# Patient Record
Sex: MALE | Race: OTHER | HISPANIC OR LATINO | Employment: UNEMPLOYED | URBAN - METROPOLITAN AREA
[De-identification: names, ages, dates, MRNs, and addresses within clinical notes are randomized per-mention and may not be internally consistent; named-entity substitution may affect disease eponyms.]

---

## 2023-10-25 ENCOUNTER — HOSPITAL ENCOUNTER (EMERGENCY)
Facility: HOSPITAL | Age: 28
Discharge: HOME | End: 2023-10-25
Attending: EMERGENCY MEDICINE

## 2023-10-25 ENCOUNTER — APPOINTMENT (EMERGENCY)
Dept: RADIOLOGY | Facility: HOSPITAL | Age: 28
End: 2023-10-25

## 2023-10-25 VITALS
WEIGHT: 140 LBS | OXYGEN SATURATION: 100 % | SYSTOLIC BLOOD PRESSURE: 119 MMHG | DIASTOLIC BLOOD PRESSURE: 60 MMHG | RESPIRATION RATE: 18 BRPM | HEART RATE: 62 BPM | TEMPERATURE: 97.5 F

## 2023-10-25 DIAGNOSIS — R07.9 CHEST PAIN: Primary | ICD-10-CM

## 2023-10-25 LAB
2HR DELTA HS TROPONIN: -1 NG/L
ALBUMIN SERPL BCP-MCNC: 4.5 G/DL (ref 3.5–5)
ALP SERPL-CCNC: 77 U/L (ref 34–104)
ALT SERPL W P-5'-P-CCNC: 22 U/L (ref 7–52)
ANION GAP SERPL CALCULATED.3IONS-SCNC: 6 MMOL/L
AST SERPL W P-5'-P-CCNC: 17 U/L (ref 13–39)
ATRIAL RATE: 60 BPM
BASOPHILS # BLD AUTO: 0.09 THOUSANDS/ÂΜL (ref 0–0.1)
BASOPHILS NFR BLD AUTO: 1 % (ref 0–1)
BILIRUB SERPL-MCNC: 0.62 MG/DL (ref 0.2–1)
BUN SERPL-MCNC: 16 MG/DL (ref 5–25)
CALCIUM SERPL-MCNC: 8.8 MG/DL (ref 8.4–10.2)
CARDIAC TROPONIN I PNL SERPL HS: 5 NG/L
CARDIAC TROPONIN I PNL SERPL HS: 6 NG/L
CHLORIDE SERPL-SCNC: 102 MMOL/L (ref 96–108)
CO2 SERPL-SCNC: 29 MMOL/L (ref 21–32)
CREAT SERPL-MCNC: 1.09 MG/DL (ref 0.6–1.3)
EOSINOPHIL # BLD AUTO: 0.3 THOUSAND/ÂΜL (ref 0–0.61)
EOSINOPHIL NFR BLD AUTO: 3 % (ref 0–6)
ERYTHROCYTE [DISTWIDTH] IN BLOOD BY AUTOMATED COUNT: 12.5 % (ref 11.6–15.1)
GFR SERPL CREATININE-BSD FRML MDRD: 90 ML/MIN/1.73SQ M
GLUCOSE SERPL-MCNC: 87 MG/DL (ref 65–140)
HCT VFR BLD AUTO: 44.7 % (ref 36.5–49.3)
HGB BLD-MCNC: 15.7 G/DL (ref 12–17)
IMM GRANULOCYTES # BLD AUTO: 0.03 THOUSAND/UL (ref 0–0.2)
IMM GRANULOCYTES NFR BLD AUTO: 0 % (ref 0–2)
LYMPHOCYTES # BLD AUTO: 4.21 THOUSANDS/ÂΜL (ref 0.6–4.47)
LYMPHOCYTES NFR BLD AUTO: 41 % (ref 14–44)
MCH RBC QN AUTO: 31.5 PG (ref 26.8–34.3)
MCHC RBC AUTO-ENTMCNC: 35.1 G/DL (ref 31.4–37.4)
MCV RBC AUTO: 90 FL (ref 82–98)
MONOCYTES # BLD AUTO: 1.02 THOUSAND/ÂΜL (ref 0.17–1.22)
MONOCYTES NFR BLD AUTO: 10 % (ref 4–12)
NEUTROPHILS # BLD AUTO: 4.73 THOUSANDS/ÂΜL (ref 1.85–7.62)
NEUTS SEG NFR BLD AUTO: 45 % (ref 43–75)
NRBC BLD AUTO-RTO: 0 /100 WBCS
P AXIS: 73 DEGREES
PLATELET # BLD AUTO: 335 THOUSANDS/UL (ref 149–390)
PMV BLD AUTO: 10.4 FL (ref 8.9–12.7)
POTASSIUM SERPL-SCNC: 3.2 MMOL/L (ref 3.5–5.3)
PR INTERVAL: 172 MS
PROT SERPL-MCNC: 7.5 G/DL (ref 6.4–8.4)
QRS AXIS: 81 DEGREES
QRSD INTERVAL: 90 MS
QT INTERVAL: 376 MS
QTC INTERVAL: 376 MS
RBC # BLD AUTO: 4.99 MILLION/UL (ref 3.88–5.62)
SODIUM SERPL-SCNC: 137 MMOL/L (ref 135–147)
T WAVE AXIS: 34 DEGREES
VENTRICULAR RATE: 60 BPM
WBC # BLD AUTO: 10.38 THOUSAND/UL (ref 4.31–10.16)

## 2023-10-25 PROCEDURE — 93005 ELECTROCARDIOGRAM TRACING: CPT

## 2023-10-25 PROCEDURE — 71045 X-RAY EXAM CHEST 1 VIEW: CPT

## 2023-10-25 PROCEDURE — 96374 THER/PROPH/DIAG INJ IV PUSH: CPT

## 2023-10-25 PROCEDURE — 36415 COLL VENOUS BLD VENIPUNCTURE: CPT

## 2023-10-25 PROCEDURE — 84484 ASSAY OF TROPONIN QUANT: CPT | Performed by: EMERGENCY MEDICINE

## 2023-10-25 PROCEDURE — 99285 EMERGENCY DEPT VISIT HI MDM: CPT | Performed by: EMERGENCY MEDICINE

## 2023-10-25 PROCEDURE — 93010 ELECTROCARDIOGRAM REPORT: CPT | Performed by: INTERNAL MEDICINE

## 2023-10-25 PROCEDURE — 80053 COMPREHEN METABOLIC PANEL: CPT | Performed by: EMERGENCY MEDICINE

## 2023-10-25 PROCEDURE — 99285 EMERGENCY DEPT VISIT HI MDM: CPT

## 2023-10-25 PROCEDURE — 85025 COMPLETE CBC W/AUTO DIFF WBC: CPT | Performed by: EMERGENCY MEDICINE

## 2023-10-25 RX ORDER — KETOROLAC TROMETHAMINE 30 MG/ML
15 INJECTION, SOLUTION INTRAMUSCULAR; INTRAVENOUS ONCE
Status: COMPLETED | OUTPATIENT
Start: 2023-10-25 | End: 2023-10-25

## 2023-10-25 RX ADMIN — KETOROLAC TROMETHAMINE 15 MG: 30 INJECTION, SOLUTION INTRAMUSCULAR; INTRAVENOUS at 09:36

## 2023-10-25 NOTE — DISCHARGE INSTRUCTIONS
We are discharging you home. Return to the emergency department if your chest pain worsens or changes in nature, or if you develop associated shortness of breath, intractable vomiting, weakness, or numbness. You should follow-up with a primary care doctor. We have provided a referral for family practice. Please schedule an appointment with them. You can take Motrin and Tylenol as needed at home for pain.

## 2023-10-25 NOTE — ED ATTENDING ATTESTATION
Reshma Vuong MD, saw and evaluated the patient. I have discussed the patient with the resident and agree with the resident's findings, Plan of Care, and MDM as documented in the resident's note, except where noted. All available labs and Radiology studies were reviewed. I was present for key portions of any procedure(s) performed by the resident and I was immediately available to provide assistance. At this point I agree with the current assessment done in the Emergency Department. I have conducted an independent evaluation of this patient a history and physical is as follows:    24 yo male with no significant past medical history presents to the ED complaining of chest pain since around 0500 this morning. The patient reports a poorly localized anterior left chest "squeezing" pain that occasionally radiates into the left arm. The pain is nonexertional and not pleuritic. Onset while at rest. The pain "comes and goes" but is spontaneously improving. No associated shortness of breath, nausea, vomiting, or diaphoresis. He denies LE swelling/pain. No cough or hemoptysis. The patient has had similar pain in the past "many times" but the symptoms generally resolve spontaneously after a few minutes. ROS: per resident physician note    Gen: NAD, AA&Ox3  HEENT: PERRL, EOMI  Neck: supple  CV: RRR  Lungs: CTA B/L  Abdomen: soft, NT/ND  Ext: no swelling or deformity  Neuro: 5/5 strength all extremities, sensation grossly intact  Skin: no rash    ED Course  The patient is very well appearing with stable vital signs and a benign physical examination. Unclear etiology of pain. Anxiety vs musculoskeletal pain vs PTX? Low clinical suspicion for ACS, TAD, and PE. The patient is PERC negative. Will check EKG, CXR, basic labs, and troponin. Toradol administered, will continue to monitor in the ED. Disposition per workup and reassessment.       Critical Care Time  Procedures

## 2023-10-25 NOTE — ED PROVIDER NOTES
History  Chief Complaint   Patient presents with    Chest Pain     Pt reports L sided chest pain and sob that started around. 0400 this morning     38-year-old male with no significant past medical history presents to ED for evaluation of chest pain beginning approximately 5 AM this morning. Patient states he was about to go to bed when he began to feel his chest pain. Patient states that the pain is located in the left chest and radiates down the left arm. He states that it feels like a "squeezing ". The pain is waxing and waning,  but improving. Patient states that episodes last approximately 5 minutes at a time. Patient states that the pain is approximately rated 7 out of 10 in severity. She also notes associated generalized weakness. Patient denies associated shortness of breath, nausea, vomiting, diaphoresis, fever, chills, abdominal pain, recent illness. Patient states that he has had similar episodes before, but they normally resolve. None       History reviewed. No pertinent past medical history. History reviewed. No pertinent surgical history. History reviewed. No pertinent family history. I have reviewed and agree with the history as documented. E-Cigarette/Vaping     E-Cigarette/Vaping Substances     Social History     Tobacco Use    Smoking status: Never    Smokeless tobacco: Never   Substance Use Topics    Drug use: Never        Review of Systems   Constitutional:  Negative for chills, diaphoresis and fever. HENT:  Negative for ear pain and sore throat. Eyes:  Negative for pain and visual disturbance. Respiratory:  Negative for cough and shortness of breath. Cardiovascular:  Positive for chest pain. Negative for palpitations and leg swelling. Gastrointestinal:  Negative for abdominal pain, nausea and vomiting. Genitourinary:  Negative for dysuria and hematuria. Musculoskeletal:  Negative for arthralgias and back pain. Skin:  Negative for color change and rash. Neurological:  Positive for weakness (Generalized) and headaches. Negative for syncope, light-headedness and numbness. Psychiatric/Behavioral:  Negative for behavioral problems and confusion. All other systems reviewed and are negative. Physical Exam  ED Triage Vitals [10/25/23 0827]   Temperature Pulse Respirations Blood Pressure SpO2   97.5 °F (36.4 °C) 73 20 152/82 99 %      Temp Source Heart Rate Source Patient Position - Orthostatic VS BP Location FiO2 (%)   Temporal Monitor Lying Left arm --      Pain Score       7             Orthostatic Vital Signs  Vitals:    10/25/23 0827 10/25/23 0900 10/25/23 1100   BP: 152/82 138/75 119/60   Pulse: 73 58 62   Patient Position - Orthostatic VS: Lying         Physical Exam  Vitals and nursing note reviewed. Constitutional:       General: He is not in acute distress. Appearance: He is well-developed and normal weight. He is not ill-appearing, toxic-appearing or diaphoretic. HENT:      Head: Normocephalic and atraumatic. Mouth/Throat:      Mouth: Mucous membranes are moist.      Pharynx: Oropharynx is clear. Eyes:      Conjunctiva/sclera: Conjunctivae normal.   Cardiovascular:      Rate and Rhythm: Normal rate and regular rhythm. Pulses: Normal pulses. Heart sounds: Normal heart sounds. Pulmonary:      Effort: Pulmonary effort is normal. No respiratory distress. Breath sounds: Normal breath sounds. Abdominal:      Palpations: Abdomen is soft. Tenderness: There is no abdominal tenderness. Musculoskeletal:         General: No swelling. Cervical back: Neck supple. Right lower leg: No tenderness. No edema. Left lower leg: No tenderness. No edema. Skin:     General: Skin is warm and dry. Capillary Refill: Capillary refill takes less than 2 seconds. Neurological:      General: No focal deficit present. Mental Status: He is alert and oriented to person, place, and time. Motor: No weakness. Comments: 5/5 strength in all extremities   Psychiatric:         Mood and Affect: Mood normal.         Behavior: Behavior normal.         ED Medications  Medications   ketorolac (TORADOL) injection 15 mg (15 mg Intravenous Given 10/25/23 0936)       Diagnostic Studies  Results Reviewed       Procedure Component Value Units Date/Time    HS Troponin I 2hr [[id number]]  (Normal) Collected: 10/25/23 1044    Lab Status: Final result Specimen: Blood from Arm, Left Updated: 10/25/23 1132     hs TnI 2hr 5 ng/L      Delta 2hr hsTnI -1 ng/L     HS Troponin I 4hr [[id number]]     Lab Status: No result Specimen: Blood     HS Troponin 0hr (reflex protocol) [[id number]]  (Normal) Collected: 10/25/23 0842    Lab Status: Final result Specimen: Blood from Arm, Left Updated: 10/25/23 0924     hs TnI 0hr 6 ng/L     Comprehensive metabolic panel [[id number]]  (Abnormal) Collected: 10/25/23 0842    Lab Status: Final result Specimen: Blood from Arm, Left Updated: 10/25/23 0918     Sodium 137 mmol/L      Potassium 3.2 mmol/L      Chloride 102 mmol/L      CO2 29 mmol/L      ANION GAP 6 mmol/L      BUN 16 mg/dL      Creatinine 1.09 mg/dL      Glucose 87 mg/dL      Calcium 8.8 mg/dL      AST 17 U/L      ALT 22 U/L      Alkaline Phosphatase 77 U/L      Total Protein 7.5 g/dL      Albumin 4.5 g/dL      Total Bilirubin 0.62 mg/dL      eGFR 90 ml/min/1.73sq m     Narrative:      [facility] guidelines for Chronic Kidney Disease (CKD):     Stage 1 with normal or high GFR (GFR > 90 mL/min/1.73 square meters)    Stage 2 Mild CKD (GFR = 60-89 mL/min/1.73 square meters)    Stage 3A Moderate CKD (GFR = 45-59 mL/min/1.73 square meters)    Stage 3B Moderate CKD (GFR = 30-44 mL/min/1.73 square meters)    Stage 4 Severe CKD (GFR = 15-29 mL/min/1.73 square meters)    Stage 5 End Stage CKD (GFR <15 mL/min/1.73 square meters)  Note: GFR calculation is accurate only with a steady state creatinine    CBC and differential [[id number]] (Abnormal) Collected: 10/25/23 0842    Lab Status: Final result Specimen: Blood from Arm, Left Updated: 10/25/23 0851     WBC 10.38 Thousand/uL      RBC 4.99 Million/uL      Hemoglobin 15.7 g/dL      Hematocrit 44.7 %      MCV 90 fL      MCH 31.5 pg      MCHC 35.1 g/dL      RDW 12.5 %      MPV 10.4 fL      Platelets 515 Thousands/uL      nRBC 0 /100 WBCs      Neutrophils Relative 45 %      Immat GRANS % 0 %      Lymphocytes Relative 41 %      Monocytes Relative 10 %      Eosinophils Relative 3 %      Basophils Relative 1 %      Neutrophils Absolute 4.73 Thousands/µL      Immature Grans Absolute 0.03 Thousand/uL      Lymphocytes Absolute 4.21 Thousands/µL      Monocytes Absolute 1.02 Thousand/µL      Eosinophils Absolute 0.30 Thousand/µL      Basophils Absolute 0.09 Thousands/µL                    XR chest 1 view portable   ED Interpretation by Sarah Ocasio DO (10/25 5998)   No acute cardiopulmonary process. No pneumonia. No pneumothorax. Final Result by Mackenzie Bower MD (10/25 7456)      No acute cardiopulmonary disease. Workstation performed: [id number]               Procedures  ECG 12 Lead Documentation Only    Date/Time: 10/25/2023 9:09 AM    Performed by: Sarah Ocasio DO  Authorized by: Sarah Ocasio DO    ECG reviewed by me, the ED Provider: yes    Patient location:  ED  Previous ECG:     Previous ECG:  Unavailable    Comparison to cardiac monitor: Yes    Interpretation:     Interpretation: normal    Rate:     ECG rate:  60  Rhythm:     Rhythm: sinus rhythm    Ectopy:     Ectopy: none    QRS:     QRS axis:  Normal    QRS intervals:  Normal  Conduction:     Conduction: normal    ST segments:     ST segments:  Normal  T waves:     T waves: normal    Comments:      No STEMI        ED Course  ED Course as of 10/25/23 1151   Wed Oct 25, 2023   0926 hs TnI 0hr: 6  Will evaluate delta troponin   1137 hs TnI 2hr: 5   1137 We will discharge patient to home.              HEART Risk Score      Flowsheet Row Most Recent Value   Heart Score Risk Calculator    History 0 Filed at: 10/25/2023 0926   ECG 0 Filed at: 10/25/2023 0926   Age 0 Filed at: 10/25/2023 0926   Risk Factors 0 Filed at: 10/25/2023 0926   Troponin 0 Filed at: 10/25/2023 0926   HEART Score 0 Filed at: 10/25/2023 0926                PERC Rule for PE      Flowsheet Row Most Recent Value   PERC Rule for PE    Age >=50 0 Filed at: 10/25/2023 0915   HR >=100 0 Filed at: 10/25/2023 0915   O2 Sat on room air < 95% 0 Filed at: 10/25/2023 0915   History of PE or DVT 0 Filed at: 10/25/2023 8647   Recent trauma or surgery 0 Filed at: 10/25/2023 0915   Hemoptysis --   Exogenous estrogen 0 Filed at: 10/25/2023 0915   Unilateral leg swelling 0 Filed at: 10/25/2023 0915   PERC Rule for PE Results 0 Filed at: 10/25/2023 [address] [facility]  Weldon Babinski is a 25 y.o. male who presents to ED for evaluation of left-sided chest pain beginning this morning. Physical exam: Vitals stable. No acute distress. heart regular rate and rhythm. Lungs clear to auscultation bilaterally. Abdomen soft nontender. Cranial nerves II through XII intact. 5 out of 5 strength in all extremities    Differential diagnoses include: Muscle strain, angina, ACS, pneumonia, PE, pneumothorax  PERC negative    Workup includes: EKG, chest x-ray, CBC, CMP, troponin. Will give Toradol for pain. Normal EKG. Troponin 0h of 6, troponin 2-hour 5. Will discharge patient home. Patient instructed to follow-up with primary care. Return precaution discussed with patient including worsening of pain, changing of pain in nature, associated shortness of breath, vomiting, or generally feeling worse. Patient states he understands. Patient has no further questions at the time of discharge. Will follow up and continue to monitor. Please see ED Course for additional information. Amount and/or Complexity of Data Reviewed  Labs: ordered. Decision-making details documented in ED Course. Radiology: ordered and independent interpretation performed. Risk  Prescription drug management. Disposition  Final diagnoses:   Chest pain     Time reflects when diagnosis was documented in both MDM as applicable and the Disposition within this note       Time User Action Codes Description Comment    10/25/2023 11:37 AM Heidi Martin Add [R07.9] Chest pain           ED Disposition       ED Disposition   Discharge    Condition   Stable    Date/Time   Wed Oct 25, 2023 1135    [address] discharge to home/self care. Follow-up Information       Follow up With Specialties Details Why Contact Info Additional [address] [id number] [address] [facility], [address] [facility], [address] [facility], [place], [facility], [address] [place] [address],[address], [address] [phone]            There are no discharge medications for this patient. No discharge procedures on file. PDMP Review       None             ED Provider  Attending physically available and evaluated Ang Tolentino. I managed the patient along with the ED Attending.     Electronically Signed by           Heidi Martin DO  10/25/23 0061